# Patient Record
Sex: MALE | Race: WHITE | URBAN - METROPOLITAN AREA
[De-identification: names, ages, dates, MRNs, and addresses within clinical notes are randomized per-mention and may not be internally consistent; named-entity substitution may affect disease eponyms.]

---

## 2024-07-08 ENCOUNTER — OFFICE VISIT (OUTPATIENT)
Age: 39
End: 2024-07-08
Payer: COMMERCIAL

## 2024-07-08 VITALS
BODY MASS INDEX: 26.47 KG/M2 | HEART RATE: 68 BPM | OXYGEN SATURATION: 100 % | SYSTOLIC BLOOD PRESSURE: 116 MMHG | HEIGHT: 76 IN | DIASTOLIC BLOOD PRESSURE: 76 MMHG | WEIGHT: 217.4 LBS

## 2024-07-08 DIAGNOSIS — R06.02 SHORT OF BREATH ON EXERTION: Primary | ICD-10-CM

## 2024-07-08 DIAGNOSIS — Z72.0 TOBACCO USE: ICD-10-CM

## 2024-07-08 DIAGNOSIS — R00.2 HEART PALPITATIONS: ICD-10-CM

## 2024-07-08 DIAGNOSIS — Z71.6 ENCOUNTER FOR COUNSELING FOR TOBACCO USE DISORDER: ICD-10-CM

## 2024-07-08 PROCEDURE — 99204 OFFICE O/P NEW MOD 45 MIN: CPT | Performed by: INTERNAL MEDICINE

## 2024-07-08 PROCEDURE — 93000 ELECTROCARDIOGRAM COMPLETE: CPT | Performed by: INTERNAL MEDICINE

## 2024-07-08 ASSESSMENT — PATIENT HEALTH QUESTIONNAIRE - PHQ9
1. LITTLE INTEREST OR PLEASURE IN DOING THINGS: NOT AT ALL
SUM OF ALL RESPONSES TO PHQ QUESTIONS 1-9: 0
SUM OF ALL RESPONSES TO PHQ9 QUESTIONS 1 & 2: 0
SUM OF ALL RESPONSES TO PHQ QUESTIONS 1-9: 0
2. FEELING DOWN, DEPRESSED OR HOPELESS: NOT AT ALL

## 2024-07-08 NOTE — PROGRESS NOTES
[facility] [place]:   [phone]      History of Present Illness:  Mr. Cobos is a 38 yo M with a family history with his dad Tano Cobos, with ventricular tachycardia, [facility] Storey and apparently was positive for a gene, heart monitor back then apparently was normal sinus rhythm, here for cardiac evaluation.  From a symptom standpoint he has palpitations approximately once a month that can last 30 seconds to a minute, often feels this when he is laying down.  No associated lightheadedness, dizziness or shortness of breath.  He does have some shortness of breath with exertion.  No chest pain. He is compensated on exam with clear lungs and no lower extremity edema. EKG was normal sinus rhythm, RSR in V1.  Soc hx. Tobacco, vaping. [employer]  Assessment/Plan:  1. Palpitations, shortness of breath. Will obtain an echocardiogram for further evaluation. [facility] that noted no evidence of arrhythmogenic right ventricular cardiomyopathy.  As noted above, the monitor demonstrated no significant arrhythmia. His dad, Tano did have ventricular tachycardia.    2. Tobacco use/vaping.  Focus on cessation.      He  has no past medical history on file.    All other systems negative except as above.     PE  Vitals:    07/08/24 0816   BP: 116/76   Site: Left Upper Arm   Position: Sitting   Cuff Size: Medium Adult   Pulse: 68   SpO2: 100%   Weight: 98.6 kg (217 lb 6.4 oz)   Height: 1.93 m (6' 4\")    Body mass index is 26.46 kg/m².  General appearance - alert, well appearing, and in no distress  Mental status - affect appropriate to mood  Eyes - sclera anicteric, moist mucous membranes  Neck - supple, no JVD  Chest - clear to auscultation, no wheezes, rales or rhonchi  Heart - normal rate, regular rhythm, normal S1, S2, no murmurs, rubs, clicks or gallops  Abdomen - soft, nontender,

## 2024-07-10 ENCOUNTER — TELEPHONE (OUTPATIENT)
Age: 39
End: 2024-07-10

## 2024-07-10 NOTE — TELEPHONE ENCOUNTER
Telephone call made to patient. Two patient identifiers verified.   The patient wanted to know if Dr. So wanted him to get labs done. Dr. So gave VO that patient did not need labs.     Let patient know.

## 2024-07-10 NOTE — TELEPHONE ENCOUNTER
----- Message from Thiago So MD sent at 7/9/2024 11:20 PM EDT -----  Please let pt know I reviewed his records from [facility]. His cardiac MRI and loop monitor were both normal. thx

## 2024-08-27 ENCOUNTER — TELEPHONE (OUTPATIENT)
Age: 39
End: 2024-08-27

## 2024-08-27 NOTE — TELEPHONE ENCOUNTER
Patient rtc. 2 PI verified.     Went over results with patient. Verified understanding. All questions answered.

## 2024-08-27 NOTE — TELEPHONE ENCOUNTER
----- Message from Dr. Thiago So MD sent at 8/27/2024 12:00 AM EDT -----  Please let pt know echo was normal. thx

## 2025-05-05 ENCOUNTER — TELEPHONE (OUTPATIENT)
Age: 40
End: 2025-05-05